# Patient Record
Sex: FEMALE | Race: WHITE | Employment: OTHER | URBAN - NONMETROPOLITAN AREA
[De-identification: names, ages, dates, MRNs, and addresses within clinical notes are randomized per-mention and may not be internally consistent; named-entity substitution may affect disease eponyms.]

---

## 2017-08-14 ENCOUNTER — OFFICE VISIT (OUTPATIENT)
Dept: PULMONOLOGY | Age: 72
End: 2017-08-14
Payer: MEDICARE

## 2017-08-14 VITALS
BODY MASS INDEX: 24.11 KG/M2 | TEMPERATURE: 96.7 F | WEIGHT: 162.8 LBS | HEIGHT: 69 IN | OXYGEN SATURATION: 94 % | SYSTOLIC BLOOD PRESSURE: 116 MMHG | HEART RATE: 67 BPM | DIASTOLIC BLOOD PRESSURE: 70 MMHG

## 2017-08-14 DIAGNOSIS — J43.1 PANLOBULAR EMPHYSEMA (HCC): Primary | ICD-10-CM

## 2017-08-14 DIAGNOSIS — J42 CHRONIC BRONCHITIS, UNSPECIFIED CHRONIC BRONCHITIS TYPE (HCC): ICD-10-CM

## 2017-08-14 PROCEDURE — 3014F SCREEN MAMMO DOC REV: CPT | Performed by: INTERNAL MEDICINE

## 2017-08-14 PROCEDURE — 99213 OFFICE O/P EST LOW 20 MIN: CPT | Performed by: INTERNAL MEDICINE

## 2017-08-14 PROCEDURE — 4040F PNEUMOC VAC/ADMIN/RCVD: CPT | Performed by: INTERNAL MEDICINE

## 2017-08-14 PROCEDURE — 1090F PRES/ABSN URINE INCON ASSESS: CPT | Performed by: INTERNAL MEDICINE

## 2017-08-14 PROCEDURE — G8926 SPIRO NO PERF OR DOC: HCPCS | Performed by: INTERNAL MEDICINE

## 2017-08-14 PROCEDURE — G8427 DOCREV CUR MEDS BY ELIG CLIN: HCPCS | Performed by: INTERNAL MEDICINE

## 2017-08-14 PROCEDURE — G8420 CALC BMI NORM PARAMETERS: HCPCS | Performed by: INTERNAL MEDICINE

## 2017-08-14 PROCEDURE — 1036F TOBACCO NON-USER: CPT | Performed by: INTERNAL MEDICINE

## 2017-08-14 PROCEDURE — 1123F ACP DISCUSS/DSCN MKR DOCD: CPT | Performed by: INTERNAL MEDICINE

## 2017-08-14 PROCEDURE — 3023F SPIROM DOC REV: CPT | Performed by: INTERNAL MEDICINE

## 2017-08-14 PROCEDURE — G8400 PT W/DXA NO RESULTS DOC: HCPCS | Performed by: INTERNAL MEDICINE

## 2017-08-14 PROCEDURE — 3017F COLORECTAL CA SCREEN DOC REV: CPT | Performed by: INTERNAL MEDICINE

## 2017-08-14 RX ORDER — ALBUTEROL SULFATE 90 UG/1
2 AEROSOL, METERED RESPIRATORY (INHALATION) EVERY 4 HOURS PRN
Qty: 3 INHALER | Refills: 3 | Status: SHIPPED | OUTPATIENT
Start: 2017-08-14 | End: 2017-08-21 | Stop reason: SDUPTHER

## 2017-08-14 ASSESSMENT — ENCOUNTER SYMPTOMS
SHORTNESS OF BREATH: 1
CHEST TIGHTNESS: 0
COUGH: 0
WHEEZING: 0
APNEA: 0

## 2017-08-21 ENCOUNTER — TELEPHONE (OUTPATIENT)
Dept: PULMONOLOGY | Age: 72
End: 2017-08-21

## 2017-08-21 DIAGNOSIS — J43.1 PANLOBULAR EMPHYSEMA (HCC): ICD-10-CM

## 2017-08-21 DIAGNOSIS — J42 CHRONIC BRONCHITIS, UNSPECIFIED CHRONIC BRONCHITIS TYPE (HCC): ICD-10-CM

## 2017-08-21 RX ORDER — ALBUTEROL SULFATE 90 UG/1
2 AEROSOL, METERED RESPIRATORY (INHALATION) EVERY 4 HOURS PRN
Qty: 4 INHALER | Refills: 3 | Status: SHIPPED | OUTPATIENT
Start: 2017-08-21 | End: 2017-08-21 | Stop reason: SDUPTHER

## 2017-08-21 RX ORDER — ALBUTEROL SULFATE 90 UG/1
2 AEROSOL, METERED RESPIRATORY (INHALATION) EVERY 4 HOURS PRN
Qty: 1 INHALER | Refills: 0 | Status: SHIPPED | OUTPATIENT
Start: 2017-08-21 | End: 2018-08-15 | Stop reason: SDUPTHER

## 2018-08-15 ENCOUNTER — OFFICE VISIT (OUTPATIENT)
Dept: PULMONOLOGY | Age: 73
End: 2018-08-15
Payer: MEDICARE

## 2018-08-15 VITALS
SYSTOLIC BLOOD PRESSURE: 136 MMHG | BODY MASS INDEX: 21.42 KG/M2 | OXYGEN SATURATION: 94 % | WEIGHT: 144.6 LBS | HEIGHT: 69 IN | TEMPERATURE: 100.1 F | HEART RATE: 98 BPM | DIASTOLIC BLOOD PRESSURE: 78 MMHG

## 2018-08-15 DIAGNOSIS — F17.211 CIGARETTE NICOTINE DEPENDENCE IN REMISSION: ICD-10-CM

## 2018-08-15 DIAGNOSIS — J44.9 CHRONIC OBSTRUCTIVE PULMONARY DISEASE, UNSPECIFIED COPD TYPE (HCC): Primary | ICD-10-CM

## 2018-08-15 PROCEDURE — 99213 OFFICE O/P EST LOW 20 MIN: CPT | Performed by: NURSE PRACTITIONER

## 2018-08-15 RX ORDER — ALBUTEROL SULFATE 90 UG/1
2 AEROSOL, METERED RESPIRATORY (INHALATION) EVERY 6 HOURS PRN
Qty: 3 INHALER | Refills: 3 | Status: SHIPPED | OUTPATIENT
Start: 2018-08-15 | End: 2019-01-08 | Stop reason: ALTCHOICE

## 2018-08-15 NOTE — PROGRESS NOTES
Dose Chest CT - (3-6 month)  follow-up to CT lung screening only    CT Lung Screening (Annual)   2.  Cigarette nicotine dependence in remission  Low Dose Chest CT - (3-6 month)  follow-up to CT lung screening only    CT Lung Screening (Annual)         Plan   Start Trelegy 1 puff daily (1 sample given with demonstration)  Stop Spiriva and Flovent  Continue Ventolin as needed only  CT Lung Screening  She refuses repeat PFT (not able to plug nose for testing)      Will see Freedom Gomes back in: 8 weeks    Electronically signed by RONA Mcmillan CNP on 8/15/2018 at 1:33 PM'

## 2018-08-30 ENCOUNTER — TELEPHONE (OUTPATIENT)
Dept: PULMONOLOGY | Age: 73
End: 2018-08-30

## 2018-11-02 ENCOUNTER — HOSPITAL ENCOUNTER (OUTPATIENT)
Dept: CT IMAGING | Age: 73
Discharge: HOME | End: 2018-11-02
Payer: MEDICARE

## 2018-11-02 DIAGNOSIS — F17.211 CIGARETTE NICOTINE DEPENDENCE IN REMISSION: ICD-10-CM

## 2018-11-02 DIAGNOSIS — J44.9 CHRONIC OBSTRUCTIVE PULMONARY DISEASE, UNSPECIFIED COPD TYPE (HCC): ICD-10-CM

## 2018-11-02 PROCEDURE — G0297 LDCT FOR LUNG CA SCREEN: HCPCS

## 2018-11-05 DIAGNOSIS — J44.9 CHRONIC OBSTRUCTIVE PULMONARY DISEASE, UNSPECIFIED COPD TYPE (HCC): Primary | ICD-10-CM

## 2018-11-08 ENCOUNTER — TELEPHONE (OUTPATIENT)
Dept: PULMONOLOGY | Age: 73
End: 2018-11-08

## 2018-11-09 NOTE — TELEPHONE ENCOUNTER
Patient notified. She also states that she has been drinking a lot of coffee lately. Advised her to cut back on the coffee.

## 2018-11-12 DIAGNOSIS — J44.9 CHRONIC OBSTRUCTIVE PULMONARY DISEASE, UNSPECIFIED COPD TYPE (HCC): ICD-10-CM

## 2018-11-13 ENCOUNTER — TELEPHONE (OUTPATIENT)
Dept: PULMONOLOGY | Age: 73
End: 2018-11-13

## 2018-11-21 DIAGNOSIS — J44.9 CHRONIC OBSTRUCTIVE PULMONARY DISEASE, UNSPECIFIED COPD TYPE (HCC): ICD-10-CM

## 2018-12-04 ENCOUNTER — TELEPHONE (OUTPATIENT)
Dept: PULMONOLOGY | Age: 73
End: 2018-12-04

## 2018-12-04 NOTE — TELEPHONE ENCOUNTER
Pt called for CT results. She had cancelled her office appt because she had trouble getting the prior auth. After getting auth she got CT done ,but never got a f/u appt. Calling for results. Do you need to see her?  or can we give results this time? ?  Recommendations

## 2019-01-08 ENCOUNTER — OFFICE VISIT (OUTPATIENT)
Dept: PULMONOLOGY | Age: 74
End: 2019-01-08
Payer: MEDICARE

## 2019-01-08 VITALS
TEMPERATURE: 98 F | OXYGEN SATURATION: 94 % | RESPIRATION RATE: 18 BRPM | WEIGHT: 144 LBS | BODY MASS INDEX: 21.33 KG/M2 | HEART RATE: 81 BPM | SYSTOLIC BLOOD PRESSURE: 134 MMHG | DIASTOLIC BLOOD PRESSURE: 80 MMHG | HEIGHT: 69 IN

## 2019-01-08 DIAGNOSIS — R91.8 LUNG MASS: ICD-10-CM

## 2019-01-08 DIAGNOSIS — J30.9 ALLERGIC RHINITIS, UNSPECIFIED SEASONALITY, UNSPECIFIED TRIGGER: ICD-10-CM

## 2019-01-08 DIAGNOSIS — J44.9 STAGE 4 VERY SEVERE COPD BY GOLD CLASSIFICATION (HCC): Primary | ICD-10-CM

## 2019-01-08 PROCEDURE — 99214 OFFICE O/P EST MOD 30 MIN: CPT | Performed by: NURSE PRACTITIONER

## 2019-01-08 RX ORDER — ALBUTEROL SULFATE 90 UG/1
2 AEROSOL, METERED RESPIRATORY (INHALATION) EVERY 6 HOURS PRN
COMMUNITY
End: 2020-01-02 | Stop reason: SDUPTHER

## 2019-01-08 RX ORDER — FLUTICASONE PROPIONATE 50 MCG
1 SPRAY, SUSPENSION (ML) NASAL DAILY
Qty: 1 BOTTLE | Refills: 2 | COMMUNITY
Start: 2019-01-08 | End: 2019-03-26 | Stop reason: ALTCHOICE

## 2019-01-17 ENCOUNTER — HOSPITAL ENCOUNTER (OUTPATIENT)
Dept: CT IMAGING | Age: 74
Discharge: HOME | End: 2019-01-17
Payer: MEDICARE

## 2019-01-17 DIAGNOSIS — Z00.6 EXAMINATION FOR NORMAL COMPARISON FOR CLINICAL RESEARCH: ICD-10-CM

## 2019-01-17 PROCEDURE — 3209999900 CT INTERPRETATION OF OUTSIDE IMAGES

## 2019-01-21 ENCOUNTER — CLINICAL DOCUMENTATION (OUTPATIENT)
Dept: PULMONOLOGY | Age: 74
End: 2019-01-21

## 2019-01-31 ENCOUNTER — TELEPHONE (OUTPATIENT)
Dept: PULMONOLOGY | Age: 74
End: 2019-01-31

## 2019-01-31 DIAGNOSIS — R91.1 LUNG NODULE: Primary | ICD-10-CM

## 2019-03-20 ENCOUNTER — HOSPITAL ENCOUNTER (OUTPATIENT)
Dept: PET IMAGING | Age: 74
Discharge: HOME | End: 2019-03-20
Payer: MEDICARE

## 2019-03-20 DIAGNOSIS — R91.1 LUNG NODULE: ICD-10-CM

## 2019-03-20 PROCEDURE — 78815 PET IMAGE W/CT SKULL-THIGH: CPT

## 2019-03-20 PROCEDURE — A9552 F18 FDG: HCPCS | Performed by: NURSE PRACTITIONER

## 2019-03-20 PROCEDURE — 3430000000 HC RX DIAGNOSTIC RADIOPHARMACEUTICAL: Performed by: NURSE PRACTITIONER

## 2019-03-20 RX ORDER — FLUDEOXYGLUCOSE F 18 200 MCI/ML
14.1 INJECTION, SOLUTION INTRAVENOUS
Status: COMPLETED | OUTPATIENT
Start: 2019-03-20 | End: 2019-03-20

## 2019-03-20 RX ADMIN — FLUDEOXYGLUCOSE F 18 14.1 MILLICURIE: 200 INJECTION, SOLUTION INTRAVENOUS at 12:35

## 2019-03-26 ENCOUNTER — OFFICE VISIT (OUTPATIENT)
Dept: PULMONOLOGY | Age: 74
End: 2019-03-26
Payer: MEDICARE

## 2019-03-26 VITALS
DIASTOLIC BLOOD PRESSURE: 74 MMHG | TEMPERATURE: 97.9 F | BODY MASS INDEX: 22.1 KG/M2 | RESPIRATION RATE: 20 BRPM | WEIGHT: 149.2 LBS | SYSTOLIC BLOOD PRESSURE: 130 MMHG | OXYGEN SATURATION: 99 % | HEART RATE: 78 BPM | HEIGHT: 69 IN

## 2019-03-26 DIAGNOSIS — J30.2 SEASONAL ALLERGIC RHINITIS, UNSPECIFIED TRIGGER: ICD-10-CM

## 2019-03-26 DIAGNOSIS — R91.8 LUNG MASS: Primary | ICD-10-CM

## 2019-03-26 PROCEDURE — 99214 OFFICE O/P EST MOD 30 MIN: CPT | Performed by: NURSE PRACTITIONER

## 2019-03-26 RX ORDER — FEXOFENADINE HCL 180 MG/1
180 TABLET ORAL DAILY
Qty: 30 TABLET | Refills: 11 | Status: SHIPPED | OUTPATIENT
Start: 2019-03-26 | End: 2019-04-25

## 2019-03-26 RX ORDER — AZELASTINE HYDROCHLORIDE, FLUTICASONE PROPIONATE 137; 50 UG/1; UG/1
1 SPRAY, METERED NASAL 2 TIMES DAILY
Qty: 1 BOTTLE | Refills: 11 | Status: SHIPPED | OUTPATIENT
Start: 2019-03-26 | End: 2019-04-02 | Stop reason: CLARIF

## 2019-04-02 ENCOUNTER — TELEPHONE (OUTPATIENT)
Dept: PULMONOLOGY | Age: 74
End: 2019-04-02

## 2019-04-02 RX ORDER — AZELASTINE 1 MG/ML
1 SPRAY, METERED NASAL 2 TIMES DAILY
Qty: 2 BOTTLE | Refills: 1 | Status: SHIPPED | OUTPATIENT
Start: 2019-04-02 | End: 2020-08-24

## 2019-04-02 RX ORDER — FLUTICASONE PROPIONATE 50 MCG
1 SPRAY, SUSPENSION (ML) NASAL DAILY
Qty: 1 BOTTLE | Refills: 11 | Status: SHIPPED | OUTPATIENT
Start: 2019-04-02 | End: 2020-08-24

## 2019-12-30 NOTE — TELEPHONE ENCOUNTER
Fish Byrd called requesting a refill on the following medications:  Requested Prescriptions     Pending Prescriptions Disp Refills    albuterol sulfate HFA (PROAIR HFA) 108 (90 Base) MCG/ACT inhaler 1 Inhaler      Sig: Inhale 2 puffs into the lungs every 6 hours as needed for Wheezing     Pharmacy verified:  Yes         Date of last visit: 3-26-19  Date of next visit (if applicable): 3/68/8029

## 2020-01-02 RX ORDER — ALBUTEROL SULFATE 90 UG/1
2 AEROSOL, METERED RESPIRATORY (INHALATION) EVERY 6 HOURS PRN
Qty: 1 INHALER | Refills: 3 | Status: SHIPPED | OUTPATIENT
Start: 2020-01-02 | End: 2020-09-01 | Stop reason: SDUPTHER

## 2020-03-04 ENCOUNTER — TELEPHONE (OUTPATIENT)
Dept: PULMONOLOGY | Age: 75
End: 2020-03-04

## 2020-03-04 NOTE — TELEPHONE ENCOUNTER
Patient called in and asked to cancel all testing and appointments at this time. She fell and broke her hip. She would like to CB to reschedule.

## 2020-06-24 ENCOUNTER — TELEPHONE (OUTPATIENT)
Dept: PULMONOLOGY | Age: 75
End: 2020-06-24

## 2020-07-24 RX ORDER — ALBUTEROL SULFATE 90 UG/1
AEROSOL, METERED RESPIRATORY (INHALATION)
Qty: 6.7 INHALER | Refills: 3 | OUTPATIENT
Start: 2020-07-24

## 2020-08-21 ENCOUNTER — HOSPITAL ENCOUNTER (OUTPATIENT)
Dept: CT IMAGING | Age: 75
Discharge: HOME | End: 2020-08-21

## 2020-08-24 ENCOUNTER — HOSPITAL ENCOUNTER (OUTPATIENT)
Dept: GENERAL RADIOLOGY | Age: 75
Discharge: HOME | End: 2020-08-24

## 2020-08-24 ENCOUNTER — OFFICE VISIT (OUTPATIENT)
Dept: PULMONOLOGY | Age: 75
End: 2020-08-24
Payer: MEDICARE

## 2020-08-24 VITALS
TEMPERATURE: 96.6 F | BODY MASS INDEX: 22.48 KG/M2 | OXYGEN SATURATION: 99 % | DIASTOLIC BLOOD PRESSURE: 84 MMHG | WEIGHT: 151.8 LBS | HEART RATE: 81 BPM | SYSTOLIC BLOOD PRESSURE: 136 MMHG | HEIGHT: 69 IN

## 2020-08-24 PROCEDURE — 94618 PULMONARY STRESS TESTING: CPT | Performed by: NURSE PRACTITIONER

## 2020-08-24 PROCEDURE — 99214 OFFICE O/P EST MOD 30 MIN: CPT | Performed by: NURSE PRACTITIONER

## 2020-08-24 RX ORDER — BUDESONIDE AND FORMOTEROL FUMARATE DIHYDRATE 160; 4.5 UG/1; UG/1
2 AEROSOL RESPIRATORY (INHALATION) 2 TIMES DAILY
Qty: 1 INHALER | Refills: 11
Start: 2020-08-24 | End: 2020-09-01 | Stop reason: ALTCHOICE

## 2020-08-24 NOTE — PROGRESS NOTES
[facility]     Patient: Valeria Leon, 66 y.o.   : 1945   Pt of Dr. Dm Masterson   Patient presents with    Follow-up     Lung mass 1 year pulmonary follow up CTA          HPI  Oren Cline is here for 1 year follow up for severe COPD and CT of chest to follow lung mass. See below for details of history and prior visits. Last seen in 2019. Presents feeling increased SOB and palpitations and questions if it is related to Trelegy. She has done well on Trelegy for quite some time. She is a poor historian with vague description of symptoms and is known to be hesitant to medical treatment in the past.   Symptoms have been ongoing for last few months. Sustained a fall in February requiring right hip surgery. In ER in April for palpitations/SOB and reported worsening cough for last 6 weeks. Unremarkable work up with no treatment (viewed CTA and ER report/labs but no specific impression/plan reported). No cardiac history and does not recall having clearance for surgery (due to urgency of surgery and being low risk). Reports no exacerbations nor need for ATB/steroids since last seen. Has allergy symptoms, recurrent cough but not compliant with treatment. Stopped Flonase and Astelin, denies current allergy symptoms. Has deferred repeat PFT. CTA done when in ER in April showed no PE, emphysema, no mention of lung mass. 3/6/19 visit:  Oren Cline is here for follow up for lung mass with PET CT. See below for details. PET CT shows no suspicious metabolic activity for malignancy with recommendations for continues surveillance. Presents today with continues rhinorrhea not improved with Flonase. Increased RILEY use to 5-6 times per week with intermittent cough, clear sputum. Has not tried OTC allergy meds. Reports being tested for allergies years ago.  No ATB nor steroid treatment.     19 visit  abnormal CT lung screening showing a 2.1 cm mass of right mid lung. She has very severe COPD with FEV1 of 33% in 2013. She has a 40 pack year smoking history quitting in . I saw her in August for yearly follow up, changed her Mayela Punter and Flovent to Trelegy (she was using Ventolin 4 times per day for years). I ordered CT Lung screening that showed lung mass near cardiac border therefore I followed up with regular CT of chest with contrast. Unfortunately this was done at OSH and we only have the report (patient stated the rad tech said they would send) which indicates no mass nor abnormal findings. She shows no signs of malignancy. She is doing very well with Trelegy, only using RILEY 1-2 times per week. Her PFT's are essentially unchanged with FEV1 34% with decreased diffusion. She has never required home Oxygen. She does report new onset of rhinorrhea (clear drainage). She has a history of being treated for allergies in the past.    MMRC Score: 1-2    Progress History:   Since last visit any new medical issues? No  New ER or hospitlal visits? Yes   Any new or changes in medicines? No  Using inhalers? Yes   Are they helpful? No  Past Medical hx   PMH:  Past Medical History:   Diagnosis Date    COPD (chronic obstructive pulmonary disease) (HCC)     GERD (gastroesophageal reflux disease)     Lung mass     Thrush      SURGICAL HISTORY:  Past Surgical History:   Procedure Laterality Date    CHOLECYSTECTOMY      LIVER SURGERY      Liver repair, was clipped during previous surgery.     STAPEDES SURGERY       SOCIAL HISTORY:  Social History     Tobacco Use    Smoking status: Former Smoker     Packs/day: 1.00     Years: 40.00     Pack years: 40.00     Types: Cigarettes     Start date: 1968     Last attempt to quit: 2012     Years since quittin.1    Smokeless tobacco: Never Used   Substance Use Topics    Alcohol use: No     Alcohol/week: 0.0 standard drinks    Drug use: No     ALLERGIES:  Allergies   Allergen Reactions    Pcn [Penicillins]     Vicodin [Hydrocodone-Acetaminophen]      FAMILY HISTORY:No family history on file. CURRENT MEDICATIONS:  Current Outpatient Medications   Medication Sig Dispense Refill    budesonide-formoterol (SYMBICORT) 160-4.5 MCG/ACT AERO Inhale 2 puffs into the lungs 2 times daily Rinse mouth after its use. 1 Inhaler 11    albuterol sulfate HFA (PROAIR HFA) 108 (90 Base) MCG/ACT inhaler Inhale 2 puffs into the lungs every 6 hours as needed for Wheezing 1 Inhaler 3    omeprazole (PRILOSEC) 20 MG capsule Take 20 mg by mouth daily. No current facility-administered medications for this visit. Lj Wetzel   General/Constitutional: No recent loss of weight or appetite changes. No fever or chills. HENT: Negative. Eyes: Negative. Upper respiratory tract: No nasal stuffiness or post nasal drip. Lower respiratory tract/ lungs: No cough or sputum production. No hemoptysis. Cardiovascular: + palpitations, no chest pain. Gastrointestinal: No nausea or vomiting. Neurological: No focal neurologiacal weakness. Extremities: No edema. Musculoskeletal: No complaints. Genitourinary: No complaints. Hematological: Negative. Psychiatric/Behavioral: Negative. Skin: No itching. Physical exam   /84 (Site: Left Upper Arm, Position: Sitting, Cuff Size: Medium Adult)   Pulse 81   Temp 96.6 °F (35.9 °C)   Ht 5' 9\" (1.753 m)   Wt 151 lb 12.8 oz (68.9 kg)   SpO2 99% Comment: on RA  BMI 22.42 kg/m²      Constitutional: Patient appears moderately built and moderately nourished in no acute distress. Head: Normocephalic and atraumatic. Mouth/Throat: Oropharynx is clear and moist. No oral thrush. Eyes: Conjunctivae are normal. Pupils are equal, round, and reactive to light. No scleral icterus. Neck: Neck supple. No JVD or tracheal deviation present. Cardiovascular: Regular rate, regular rhythm, S1 and S2 with no murmur. Trace peripheral edema.   Pulmonary/Chest: Normal effort with diminished but clear breath sounds. No wheezing, rales or rhonchi. Normal AP diameter. No stridor. No respiratory distress. Abdominal: Soft. Bowel sounds audible. No distension or tenderness to palpation. Musculoskeletal: Moves all extremities. Lymphadenopathy: No cervical adenopathy. Neurological: Patient is alert and oriented to person, place, and time. No focal deficits. Skin: Skin is warm and dry. No clubbing, no cyanosis. Test results   Lung Nodule Screening     [x] Qualifies    []Does not qualify   [] Declined    [x] Completed      CTA 4/5/20                                                                        CT 11/20/18                         Assessment      Diagnosis Orders   1. Stage 3 severe COPD by GOLD classification ([facility] [address]     2. Lung mass     3. SOB (shortness of breath)  6 Minute Walk Test    6 Minute Walk Test   4. Palpitations     5. Allergic rhinitis, unspecified seasonality, unspecified trigger           Plan           Six Minute Walk Test  Vieques Sirienin 1945    Six minute walk test done in my office today by my medical assistant. Jo's oxygen saturation at rest on room air was 96%. Her oxygen saturation remained 94% on room air with exertion. After walking 432 feet, she had to stop due to SOB, heart rate dropped to 41 but increased to 117 when resumed walking. She ambulated a total of 756 feet, no events, did not require 02, heart rate 97 upon completion with Fidelia score of 3. Discussed findings with Rina nando and my concern that her SOB and palpitations are more cardiac related then related to her Trelegy or RILEY use. Likely explains why she gets no benefit with RILEY for SOB and worsening of palpitations. I recommend Cardiology referral, especially since symptoms started after having hip surgery and risk of possible event related to anesthesia. Rina Gonzalez defers referral despite my concerns.  Her  is currently having cardiac issues as well \"and she needs to attend to him\". I offered to order ECG and at least echocardiogram for now without referral, she defers. She requests changing Trelegy to something else so she can monitor her symptoms. 1 Sample of Symbicort 160 mcg given with instructions on use. She is to call me with an update before submitting prescription to her pharmacy. She agrees to seek medical attention if symptoms would worsen and will talk with her PCP if symptoms persist despite changing her inhaler. She would like to continue Ventolin as she felt better benefit with it in the past.  I did review CTA findings from April done at [facility], no changes in findings but will follow up with our Radiology for comparison read [facility] has not mention of mass on reading). She shows no signs of active infection nor malignancy. She agrees to follow up in 3 months to determine need for repeat Spirometry or additional testing and following CT imaging of chest.  She feels allergy symptoms are controlled at this time. Will see Jacklyn Gifford back in: 3 months.     RONA Ignacio, ACNP-C  8/24/2020

## 2020-09-01 ENCOUNTER — TELEPHONE (OUTPATIENT)
Dept: PULMONOLOGY | Age: 75
End: 2020-09-01

## 2020-09-01 RX ORDER — FLUTICASONE FUROATE, UMECLIDINIUM BROMIDE AND VILANTEROL TRIFENATATE 100; 62.5; 25 UG/1; UG/1; UG/1
1 POWDER RESPIRATORY (INHALATION) DAILY
Qty: 30 EACH | Refills: 11 | Status: SHIPPED | OUTPATIENT
Start: 2020-09-01 | End: 2021-09-01

## 2020-09-01 RX ORDER — ALBUTEROL SULFATE 90 UG/1
2 AEROSOL, METERED RESPIRATORY (INHALATION) EVERY 6 HOURS PRN
Qty: 1 INHALER | Refills: 11 | Status: SHIPPED | OUTPATIENT
Start: 2020-09-01

## 2020-09-01 NOTE — TELEPHONE ENCOUNTER
Keren President would like to discuss with you questions regarding Symbicort vs. Trelegy. Please give her a call. Thank you.

## 2020-09-04 NOTE — TELEPHONE ENCOUNTER
I called and spoke with Jodi Jiang, she is scheduled for 8/30/21 with CT Lung Screen @ [facility] 8/23/21. Requested [facility] but they will only accept orders from an on site Pulmonologist at [facility] [place], [facility]. She was agreeable to completing in [place]. Thank you.